# Patient Record
Sex: MALE | Race: BLACK OR AFRICAN AMERICAN | NOT HISPANIC OR LATINO
[De-identification: names, ages, dates, MRNs, and addresses within clinical notes are randomized per-mention and may not be internally consistent; named-entity substitution may affect disease eponyms.]

---

## 2020-05-01 ENCOUNTER — APPOINTMENT (OUTPATIENT)
Dept: ULTRASOUND IMAGING | Facility: CLINIC | Age: 40
End: 2020-05-01

## 2021-03-02 ENCOUNTER — APPOINTMENT (OUTPATIENT)
Dept: HUMAN REPRODUCTION | Facility: CLINIC | Age: 41
End: 2021-03-02

## 2021-03-24 PROBLEM — Z00.00 ENCOUNTER FOR PREVENTIVE HEALTH EXAMINATION: Status: ACTIVE | Noted: 2021-03-24

## 2021-03-29 ENCOUNTER — APPOINTMENT (OUTPATIENT)
Dept: HUMAN REPRODUCTION | Facility: CLINIC | Age: 41
End: 2021-03-29

## 2021-04-02 ENCOUNTER — APPOINTMENT (OUTPATIENT)
Dept: HUMAN REPRODUCTION | Facility: CLINIC | Age: 41
End: 2021-04-02
Payer: COMMERCIAL

## 2021-04-02 PROCEDURE — 36415 COLL VENOUS BLD VENIPUNCTURE: CPT

## 2021-04-02 PROCEDURE — 99072 ADDL SUPL MATRL&STAF TM PHE: CPT

## 2021-04-19 ENCOUNTER — APPOINTMENT (OUTPATIENT)
Dept: HUMAN REPRODUCTION | Facility: CLINIC | Age: 41
End: 2021-04-19

## 2021-10-28 ENCOUNTER — APPOINTMENT (OUTPATIENT)
Dept: SURGERY | Facility: CLINIC | Age: 41
End: 2021-10-28
Payer: COMMERCIAL

## 2021-10-28 VITALS
WEIGHT: 174.25 LBS | SYSTOLIC BLOOD PRESSURE: 115 MMHG | TEMPERATURE: 97.8 F | BODY MASS INDEX: 27.35 KG/M2 | DIASTOLIC BLOOD PRESSURE: 76 MMHG | HEART RATE: 74 BPM | OXYGEN SATURATION: 99 % | HEIGHT: 67 IN

## 2021-10-28 DIAGNOSIS — R10.9 UNSPECIFIED ABDOMINAL PAIN: ICD-10-CM

## 2021-10-28 PROCEDURE — 99203 OFFICE O/P NEW LOW 30 MIN: CPT

## 2021-10-28 RX ORDER — ESOMEPRAZOLE MAGNESIUM 40 MG/1
40 CAPSULE, DELAYED RELEASE ORAL DAILY
Qty: 30 | Refills: 2 | Status: ACTIVE | COMMUNITY
Start: 2021-10-28 | End: 1900-01-01

## 2021-10-28 NOTE — ASSESSMENT
[FreeTextEntry1] : 42 yo male with post-prandil pain on epigastrium and right quadrant pain. \par Differential includes gastritis vs biliary colic. \par -RUQ US to evaluate gallbladder\par -Trial of PPI was started. Pt will come back for follow up visit after US is done. \par -All questions were answered in detail and the patient expressed full understanding.

## 2021-10-28 NOTE — PHYSICAL EXAM
[Alert] : alert [Oriented to Person] : oriented to person [Oriented to Place] : oriented to place [Oriented to Time] : oriented to time [Calm] : calm [de-identified] : well appearing [de-identified] : soft, nt, nd

## 2021-10-28 NOTE — HISTORY OF PRESENT ILLNESS
[de-identified] : 40 yo male with no significant past medical history who presents to the office for evaluation for epigastric/right upper quadrant burning abdominal pain. He states that the pain is usually exacerbated by eating. He states that mylanta usually helps with the pain along with burping. He denied any association with particular food but states that he has some fear of eating because of pain brought on by eating.

## 2021-11-03 ENCOUNTER — APPOINTMENT (OUTPATIENT)
Dept: ULTRASOUND IMAGING | Facility: CLINIC | Age: 41
End: 2021-11-03
Payer: COMMERCIAL

## 2021-11-03 ENCOUNTER — OUTPATIENT (OUTPATIENT)
Dept: OUTPATIENT SERVICES | Facility: HOSPITAL | Age: 41
LOS: 1 days | End: 2021-11-03
Payer: COMMERCIAL

## 2021-11-03 DIAGNOSIS — R10.9 UNSPECIFIED ABDOMINAL PAIN: ICD-10-CM

## 2021-11-03 PROCEDURE — 76700 US EXAM ABDOM COMPLETE: CPT | Mod: 26

## 2021-11-03 PROCEDURE — 76700 US EXAM ABDOM COMPLETE: CPT

## 2022-08-24 ENCOUNTER — NON-APPOINTMENT (OUTPATIENT)
Age: 42
End: 2022-08-24

## 2023-02-06 ENCOUNTER — OFFICE (OUTPATIENT)
Dept: URBAN - METROPOLITAN AREA CLINIC 34 | Facility: CLINIC | Age: 43
Setting detail: OPHTHALMOLOGY
End: 2023-02-06
Payer: COMMERCIAL

## 2023-02-06 DIAGNOSIS — H02.015: ICD-10-CM

## 2023-02-06 DIAGNOSIS — H00.022: ICD-10-CM

## 2023-02-06 DIAGNOSIS — H11.153: ICD-10-CM

## 2023-02-06 DIAGNOSIS — E11.9: ICD-10-CM

## 2023-02-06 DIAGNOSIS — H25.13: ICD-10-CM

## 2023-02-06 PROBLEM — H01.004 BLEPHARITIS; LEFT UPPER LID: Status: ACTIVE | Noted: 2023-02-06

## 2023-02-06 PROBLEM — H00.025 HORDEOLUM INTERNUM;  , RIGHT LOWER LID, LEFT LOWER LID: Status: ACTIVE | Noted: 2023-02-06

## 2023-02-06 PROCEDURE — 92014 COMPRE OPH EXAM EST PT 1/>: CPT | Performed by: OPHTHALMOLOGY

## 2023-02-06 PROCEDURE — 67820 REVISE EYELASHES: CPT | Performed by: OPHTHALMOLOGY

## 2023-02-06 ASSESSMENT — REFRACTION_AUTOREFRACTION
OS_SPHERE: +0.25
OD_SPHERE: 0.00
OS_CYLINDER: +0.25
OD_CYLINDER: +0.50
OD_AXIS: 021
OS_AXIS: 037

## 2023-02-06 ASSESSMENT — KERATOMETRY
OS_AXISANGLE_DEGREES: 174
OD_AXISANGLE_DEGREES: 070
OD_K2POWER_DIOPTERS: 44.50
OS_K1POWER_DIOPTERS: 44.00
OS_K2POWER_DIOPTERS: 45.25
METHOD_AUTO_MANUAL: AUTO
OD_K1POWER_DIOPTERS: 43.75

## 2023-02-06 ASSESSMENT — LID EXAM ASSESSMENTS
OD_MEIBOMITIS: RLL 2+
OS_TRICHIASIS: LLL
OS_BLEPHARITIS: LUL T
OS_MEIBOMITIS: LLL 2+

## 2023-02-06 ASSESSMENT — CONFRONTATIONAL VISUAL FIELD TEST (CVF)
OD_FINDINGS: FULL
OS_FINDINGS: FULL

## 2023-02-06 ASSESSMENT — SPHEQUIV_DERIVED
OS_SPHEQUIV: 0.375
OD_SPHEQUIV: 0.25

## 2023-02-06 ASSESSMENT — REFRACTION_MANIFEST
OD_SPHERE: PLANO
OS_VA1: 20/20
OD_CYLINDER: 0.00
OD_AXIS: 000
OS_AXIS: 030
OS_SPHERE: PLANO
OD_VA1: 20/20
OS_CYLINDER: +0.50

## 2023-02-06 ASSESSMENT — TONOMETRY: OD_IOP_MMHG: 18

## 2023-02-06 ASSESSMENT — VISUAL ACUITY
OD_BCVA: 20/20
OS_BCVA: 20/20

## 2023-02-06 ASSESSMENT — SUPERFICIAL PUNCTATE KERATITIS (SPK): OS_SPK: 1+

## 2023-02-06 ASSESSMENT — AXIALLENGTH_DERIVED
OD_AL: 23.2694
OS_AL: 23.0455

## 2024-08-06 ENCOUNTER — DOCTOR'S OFFICE (OUTPATIENT)
Age: 44
Setting detail: OPHTHALMOLOGY
End: 2024-08-06
Payer: COMMERCIAL

## 2024-08-06 DIAGNOSIS — E11.9: ICD-10-CM

## 2024-08-06 DIAGNOSIS — H02.015: ICD-10-CM

## 2024-08-06 DIAGNOSIS — H11.153: ICD-10-CM

## 2024-08-06 DIAGNOSIS — H02.012: ICD-10-CM

## 2024-08-06 DIAGNOSIS — H00.025: ICD-10-CM

## 2024-08-06 DIAGNOSIS — H00.022: ICD-10-CM

## 2024-08-06 DIAGNOSIS — H25.13: ICD-10-CM

## 2024-08-06 DIAGNOSIS — H16.222: ICD-10-CM

## 2024-08-06 DIAGNOSIS — H01.004: ICD-10-CM

## 2024-08-06 PROCEDURE — 92014 COMPRE OPH EXAM EST PT 1/>: CPT | Performed by: OPHTHALMOLOGY

## 2024-08-06 ASSESSMENT — LID EXAM ASSESSMENTS
OS_MEIBOMITIS: LLL 2+
OD_TRICHIASIS: RLL
OD_MEIBOMITIS: RLL 2+
OS_BLEPHARITIS: LUL T
OS_TRICHIASIS: LLL

## 2024-08-06 ASSESSMENT — CONFRONTATIONAL VISUAL FIELD TEST (CVF)
OD_FINDINGS: FULL
OS_FINDINGS: FULL

## 2025-08-16 ENCOUNTER — DOCTOR'S OFFICE (OUTPATIENT)
Facility: LOCATION | Age: 45
Setting detail: OPHTHALMOLOGY
End: 2025-08-16
Payer: COMMERCIAL

## 2025-08-16 DIAGNOSIS — H00.025: ICD-10-CM

## 2025-08-16 DIAGNOSIS — H01.004: ICD-10-CM

## 2025-08-16 DIAGNOSIS — E11.9: ICD-10-CM

## 2025-08-16 DIAGNOSIS — H25.13: ICD-10-CM

## 2025-08-16 DIAGNOSIS — H02.012: ICD-10-CM

## 2025-08-16 DIAGNOSIS — H11.153: ICD-10-CM

## 2025-08-16 DIAGNOSIS — H00.022: ICD-10-CM

## 2025-08-16 DIAGNOSIS — H02.015: ICD-10-CM

## 2025-08-16 PROCEDURE — 92014 COMPRE OPH EXAM EST PT 1/>: CPT | Performed by: OPHTHALMOLOGY

## 2025-08-16 ASSESSMENT — LID EXAM ASSESSMENTS
OS_TRICHIASIS: LLL
OD_TRICHIASIS: RLL
OS_BLEPHARITIS: LUL T
OD_MEIBOMITIS: RLL 2+
OS_MEIBOMITIS: LLL 2+

## 2025-08-16 ASSESSMENT — REFRACTION_MANIFEST
OD_SPHERE: PLANO
OS_AXIS: 030
OS_VA1: 20/20
OD_AXIS: 000
OS_CYLINDER: +0.50
OD_VA1: 20/20
OS_SPHERE: PLANO
OD_CYLINDER: 0.00

## 2025-08-16 ASSESSMENT — CONFRONTATIONAL VISUAL FIELD TEST (CVF)
OD_FINDINGS: FULL
OS_FINDINGS: FULL

## 2025-08-16 ASSESSMENT — REFRACTION_AUTOREFRACTION
OD_SPHERE: +0.25
OS_SPHERE: +0.25
OS_CYLINDER: +0.25
OS_AXIS: 022
OD_AXIS: 018
OD_CYLINDER: +0.50

## 2025-08-16 ASSESSMENT — KERATOMETRY
OS_K2POWER_DIOPTERS: 44.50
OS_K1POWER_DIOPTERS: 44.00
OD_AXISANGLE_DEGREES: 061
OS_AXISANGLE_DEGREES: 081
OD_K1POWER_DIOPTERS: 43.75
OD_K2POWER_DIOPTERS: 44.50
METHOD_AUTO_MANUAL: AUTO

## 2025-08-16 ASSESSMENT — TONOMETRY
OD_IOP_MMHG: 18
OS_IOP_MMHG: 16

## 2025-08-16 ASSESSMENT — VISUAL ACUITY
OS_BCVA: 20/20
OD_BCVA: 20/20

## 2025-09-18 ENCOUNTER — APPOINTMENT (OUTPATIENT)
Dept: CT IMAGING | Facility: CLINIC | Age: 45
End: 2025-09-18

## 2025-09-18 ENCOUNTER — APPOINTMENT (OUTPATIENT)
Dept: ULTRASOUND IMAGING | Facility: CLINIC | Age: 45
End: 2025-09-18